# Patient Record
Sex: MALE | Race: BLACK OR AFRICAN AMERICAN | NOT HISPANIC OR LATINO | URBAN - METROPOLITAN AREA
[De-identification: names, ages, dates, MRNs, and addresses within clinical notes are randomized per-mention and may not be internally consistent; named-entity substitution may affect disease eponyms.]

---

## 2018-02-12 ENCOUNTER — EMERGENCY (EMERGENCY)
Facility: HOSPITAL | Age: 23
LOS: 1 days | Discharge: HOME | End: 2018-02-12
Attending: EMERGENCY MEDICINE | Admitting: EMERGENCY MEDICINE
Payer: MEDICAID

## 2018-02-12 VITALS
RESPIRATION RATE: 18 BRPM | DIASTOLIC BLOOD PRESSURE: 68 MMHG | OXYGEN SATURATION: 100 % | HEIGHT: 72 IN | SYSTOLIC BLOOD PRESSURE: 109 MMHG | HEART RATE: 123 BPM | TEMPERATURE: 99 F | WEIGHT: 184.97 LBS

## 2018-02-12 VITALS
HEART RATE: 60 BPM | DIASTOLIC BLOOD PRESSURE: 82 MMHG | OXYGEN SATURATION: 99 % | SYSTOLIC BLOOD PRESSURE: 137 MMHG | TEMPERATURE: 99 F | RESPIRATION RATE: 18 BRPM

## 2018-02-12 PROCEDURE — 99283 EMERGENCY DEPT VISIT LOW MDM: CPT

## 2018-02-12 PROCEDURE — 99284 EMERGENCY DEPT VISIT MOD MDM: CPT

## 2018-02-12 RX ORDER — ONDANSETRON 8 MG/1
4 TABLET, FILM COATED ORAL ONCE
Qty: 0 | Refills: 0 | Status: DISCONTINUED | OUTPATIENT
Start: 2018-02-12 | End: 2018-02-16

## 2018-02-12 RX ORDER — ONDANSETRON 8 MG/1
4 TABLET, FILM COATED ORAL ONCE
Qty: 0 | Refills: 0 | Status: COMPLETED | OUTPATIENT
Start: 2018-02-12 | End: 2018-02-12

## 2018-02-12 RX ORDER — ONDANSETRON 8 MG/1
1 TABLET, FILM COATED ORAL
Qty: 6 | Refills: 0
Start: 2018-02-12 | End: 2018-02-13

## 2018-02-12 RX ADMIN — ONDANSETRON 4 MILLIGRAM(S): 8 TABLET, FILM COATED ORAL at 22:02

## 2018-02-12 NOTE — ED PROVIDER NOTE - PROGRESS NOTE DETAILS
Pt spit out initial zofran dose immediately after taking due to taste. Will re-dose, then po challenge

## 2018-02-12 NOTE — ED ADULT TRIAGE NOTE - CHIEF COMPLAINT QUOTE
vomiting x 2 days; poor po intake; fever and cough this weekend; two family members at home with flu

## 2018-02-12 NOTE — ED SUB INTERN NOTE - OBJECTIVE STATEMENT FT
Pt is 22y M with no PMH p/w coughing and subjective fever x 3 days and vomiting x 2 days. His coughing and fever have since improved. However, starting Saturday, the pt started vomiting with food. Today he is unable to tolerate any PO intake and has been vomiting a/w abd pain. Has constipation x 4 days and feels fatigued. Denies diarrhea, HA, sore throat, sneezing.    IMM: did not receive flu shot this year. All other vaccines UTD.  PMH: none  PSH: none  Med: none  ALL: NKDA

## 2018-02-12 NOTE — ED SUB INTERN NOTE - MEDICAL DECISION MAKING DETAILS
Pt had URI symptoms that have improved. Now with vomiting on PO intake and abd pain. Pt had URI symptoms that have improved. Now with vomiting on PO intake and abd pain. Likely viral illness. Will give Zofran then PO challenge.

## 2018-02-12 NOTE — ED PROVIDER NOTE - OBJECTIVE STATEMENT
22y M with no PMH p/w coughing and subjective fever x 3 days and vomiting x 2 days. Initial 2 days of subjective fever, cough which has since resolved but beginning 2 days ago (1 day after illness onset) developed vomiting, only with food. Since then has had progression of his nausea and today is unable to tolerate most liquids. Notes constipation x 4 days and feels fatigued. Denies diarrhea, HA, sore throat, sneezing. 2 Siblings at home with Flu+

## 2018-02-12 NOTE — ED PROVIDER NOTE - MEDICAL DECISION MAKING DETAILS
Maya Meneses MD - Attending Physician: Pt here with vomiting. Initially subjective fever, +sick contacts with Flu. Zofran and po chall

## 2020-10-01 ENCOUNTER — EMERGENCY (EMERGENCY)
Facility: HOSPITAL | Age: 25
LOS: 1 days | Discharge: HOME | End: 2020-10-01
Attending: EMERGENCY MEDICINE
Payer: MEDICAID

## 2020-10-01 VITALS
TEMPERATURE: 99 F | HEART RATE: 68 BPM | HEIGHT: 72 IN | SYSTOLIC BLOOD PRESSURE: 126 MMHG | DIASTOLIC BLOOD PRESSURE: 69 MMHG | RESPIRATION RATE: 17 BRPM | WEIGHT: 164.91 LBS | OXYGEN SATURATION: 99 %

## 2020-10-01 VITALS
DIASTOLIC BLOOD PRESSURE: 62 MMHG | SYSTOLIC BLOOD PRESSURE: 126 MMHG | HEART RATE: 62 BPM | RESPIRATION RATE: 18 BRPM | OXYGEN SATURATION: 100 %

## 2020-10-01 LAB
ANION GAP SERPL CALC-SCNC: 11 MMOL/L — SIGNIFICANT CHANGE UP (ref 5–17)
BASOPHILS # BLD AUTO: 0.03 K/UL — SIGNIFICANT CHANGE UP (ref 0–0.2)
BASOPHILS NFR BLD AUTO: 0.4 % — SIGNIFICANT CHANGE UP (ref 0–2)
BUN SERPL-MCNC: 9 MG/DL — SIGNIFICANT CHANGE UP (ref 7–23)
CALCIUM SERPL-MCNC: 9.8 MG/DL — SIGNIFICANT CHANGE UP (ref 8.4–10.5)
CHLORIDE SERPL-SCNC: 104 MMOL/L — SIGNIFICANT CHANGE UP (ref 96–108)
CO2 SERPL-SCNC: 25 MMOL/L — SIGNIFICANT CHANGE UP (ref 22–31)
CREAT SERPL-MCNC: 0.89 MG/DL — SIGNIFICANT CHANGE UP (ref 0.5–1.3)
EOSINOPHIL # BLD AUTO: 0.16 K/UL — SIGNIFICANT CHANGE UP (ref 0–0.5)
EOSINOPHIL NFR BLD AUTO: 2 % — SIGNIFICANT CHANGE UP (ref 0–6)
GLUCOSE SERPL-MCNC: 82 MG/DL — SIGNIFICANT CHANGE UP (ref 70–99)
HCT VFR BLD CALC: 40.4 % — SIGNIFICANT CHANGE UP (ref 39–50)
HGB BLD-MCNC: 12.6 G/DL — LOW (ref 13–17)
IMM GRANULOCYTES NFR BLD AUTO: 0.1 % — SIGNIFICANT CHANGE UP (ref 0–1.5)
LYMPHOCYTES # BLD AUTO: 2.49 K/UL — SIGNIFICANT CHANGE UP (ref 1–3.3)
LYMPHOCYTES # BLD AUTO: 30.4 % — SIGNIFICANT CHANGE UP (ref 13–44)
MCHC RBC-ENTMCNC: 25.8 PG — LOW (ref 27–34)
MCHC RBC-ENTMCNC: 31.2 GM/DL — LOW (ref 32–36)
MCV RBC AUTO: 82.8 FL — SIGNIFICANT CHANGE UP (ref 80–100)
MONOCYTES # BLD AUTO: 0.63 K/UL — SIGNIFICANT CHANGE UP (ref 0–0.9)
MONOCYTES NFR BLD AUTO: 7.7 % — SIGNIFICANT CHANGE UP (ref 2–14)
NEUTROPHILS # BLD AUTO: 4.86 K/UL — SIGNIFICANT CHANGE UP (ref 1.8–7.4)
NEUTROPHILS NFR BLD AUTO: 59.4 % — SIGNIFICANT CHANGE UP (ref 43–77)
NRBC # BLD: 0 /100 WBCS — SIGNIFICANT CHANGE UP (ref 0–0)
PLATELET # BLD AUTO: 211 K/UL — SIGNIFICANT CHANGE UP (ref 150–400)
POTASSIUM SERPL-MCNC: 3.8 MMOL/L — SIGNIFICANT CHANGE UP (ref 3.5–5.3)
POTASSIUM SERPL-SCNC: 3.8 MMOL/L — SIGNIFICANT CHANGE UP (ref 3.5–5.3)
RBC # BLD: 4.88 M/UL — SIGNIFICANT CHANGE UP (ref 4.2–5.8)
RBC # FLD: 13.1 % — SIGNIFICANT CHANGE UP (ref 10.3–14.5)
SODIUM SERPL-SCNC: 140 MMOL/L — SIGNIFICANT CHANGE UP (ref 135–145)
WBC # BLD: 8.18 K/UL — SIGNIFICANT CHANGE UP (ref 3.8–10.5)
WBC # FLD AUTO: 8.18 K/UL — SIGNIFICANT CHANGE UP (ref 3.8–10.5)

## 2020-10-01 PROCEDURE — 71046 X-RAY EXAM CHEST 2 VIEWS: CPT

## 2020-10-01 PROCEDURE — 99284 EMERGENCY DEPT VISIT MOD MDM: CPT

## 2020-10-01 PROCEDURE — 71046 X-RAY EXAM CHEST 2 VIEWS: CPT | Mod: 26

## 2020-10-01 PROCEDURE — 80048 BASIC METABOLIC PNL TOTAL CA: CPT

## 2020-10-01 PROCEDURE — 36415 COLL VENOUS BLD VENIPUNCTURE: CPT

## 2020-10-01 PROCEDURE — 85025 COMPLETE CBC W/AUTO DIFF WBC: CPT

## 2020-10-01 PROCEDURE — 99283 EMERGENCY DEPT VISIT LOW MDM: CPT

## 2020-10-01 NOTE — ED ADULT NURSE REASSESSMENT NOTE - NS ED NURSE REASSESS COMMENT FT1
ALEJO Abebe contacted patient at this time. patient verbally stated "I took the IV out of my arm" patient made aware that [facility] police was notified.

## 2020-10-01 NOTE — ED PROVIDER NOTE - CLINICAL SUMMARY MEDICAL DECISION MAKING FREE TEXT BOX
Pt with recent night sweats, lymphadenopathy,  malaise, weight loss 15lb- no sick contacts- check bloods and cxr- if normal dc home   f/u with gen medicine

## 2020-10-01 NOTE — ED ADULT NURSE REASSESSMENT NOTE - NS ED NURSE REASSESS COMMENT FT1
ALEJO Abebe spoke to [facility] and stated that patient stated "I took IV out of my arm before leaving"

## 2020-10-01 NOTE — ED ADULT NURSE REASSESSMENT NOTE - NS ED NURSE REASSESS COMMENT FT1
patient eloped to without d/c paperwork. patient called at home to come back to ED for paperwork and to make sure IV is no longer in place. ALEJO valero and charge nurse aureliano made aware.

## 2020-10-01 NOTE — ED PROVIDER NOTE - NSFOLLOWUPINSTRUCTIONS_ED_ALL_ED_FT
REst, increase activity as tolerated    REturn to the ER for any concerns    Follow up with medicine clinic [phone]    Take multi vitamins daily Rest, increase activity as tolerated    Return to the ER for any concerns    Follow up with medicine clinic [phone]    Take multi vitamins daily

## 2020-10-01 NOTE — ED ADULT NURSE REASSESSMENT NOTE - NS ED NURSE REASSESS COMMENT FT1
patient called several times to come back to ED for paper work and to verify IV is no longer in place. patient stated he would come back but has not shown up at this time. ALEJO Abebe made aware.

## 2020-10-01 NOTE — ED PROVIDER NOTE - PHYSICAL EXAMINATION
GEN: NAD, awake, eyes open spontaneously  EYES: normal conjunctiva, perrl  ENT: NCAT, MMM, Trachea midline, anterior LAD noted  CHEST/LUNGS: Non-tachypneic, CTAB, bilateral breath sounds, no axillary LAD  CARDIAC: Non-tachycardic, normal perfusion  ABDOMEN: Soft, NTND, No rebound/guarding, no hepatosplenomegaly  MSK: No edema, no gross deformity of extremities  SKIN: No rashes, no petechiae, no vesicles  NEURO: CN grossly intact, normal coordination, no focal motor or sensory deficits  PSYCH: Alert, appropriate, cooperative, with capacity and insight

## 2020-10-01 NOTE — ED ADULT NURSE NOTE - NSELOPED_ED_ALL_ED
Call was placed to patient's given phone number to arrange for patient to  instructions and/or prescription./Patient's chart was referred to charge nurse/supervisor for disposition of prescription and/or discharge instructions.

## 2020-10-01 NOTE — ED ADULT TRIAGE NOTE - CHIEF COMPLAINT QUOTE
pt c/o R neck pain x yesterday associated with fatigue.  per pt, he noticed a lump in his neck and to L axilla.

## 2020-10-01 NOTE — ED ADULT NURSE NOTE - OBJECTIVE STATEMENT
24 yo male with no pmhx, pshx coming to ED c/o right sided lump on the neck. Pt noticed lump in his axilla at first, which is now gone and noted the lump on his neck today. Pt denies any fever, chills, n/v/d, difficulty breathing, difficulty swallowing. States he has some pain in the neck with movements. Otherwise feels in his normal state of health. VSS/ NAD. He is A&O x4 and well appearing. Labs drawn and sent. Pt made aware of the care plan.

## 2020-10-01 NOTE — ED ADULT NURSE REASSESSMENT NOTE - NS ED NURSE REASSESS COMMENT FT1
report received from tracie rueda. patient resting comfortably on stretcher. patient denies pain/discomfort at this time. patient is aaox3, lungs CTA bilaterally, abd soft, nondistended, nontender, cap refill <3, radial pulses +2 bilaterally. patient denies chest pain, shortness of breath, ha, dizziness, weakness, numbness or tingling, fever, chills, cough, abd pain, changes in bowel or bladder, hematuria, bloody stool. patient comfort and safety provided. will continue to monitor.

## 2020-10-01 NOTE — ED PROVIDER NOTE - PROGRESS NOTE DETAILS
Ramy Layton MD, FACEP: patient left before waiting for his discharge paperwork and removal of IV. 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 Ramy Layton MD, FACEP: patient educated that he should follow up with primary medical doctor and possibly hematology/oncology if needed.   The patient and/or family/guardian were given the opportunity to ask questions and have them answered in full. The patient and/or family/guardian are with capacity and insight into the situation, treatment, risks, benefits, alternative therapies, and understand that they can ask any further questions if needed.

## 2020-10-01 NOTE — ED ADULT NURSE NOTE - NS ED NURSE ELOPE COMMENTS
patient was called at home and told to return to ED for d/c instructions and to verfiy IV is out. charge nurse aureliano mcintosh and lia lane made aware.

## 2020-10-01 NOTE — ED PROVIDER NOTE - PATIENT PORTAL LINK FT
You can access the FollowMyHealth Patient Portal offered by [facility] by registering at the following website: [email or web address]. By joining [facility]’s FollowMyHealth portal, you will also be able to view your health information using other applications (apps) compatible with our system.

## 2020-10-01 NOTE — ED ADULT NURSE REASSESSMENT NOTE - NS ED NURSE REASSESS COMMENT FT1
patient still has not arrived to ED. [employer] [employer] notified at this time. spoke with officer otoniel and notified them of patient info and possibility that patient remains with IV. waiting call back with update.

## 2020-10-01 NOTE — ED PROVIDER NOTE - OBJECTIVE STATEMENT
24 yo male in room orange 12 presents to the ER for evaluation of pain to right anterior neck.   Pt states 26 yo male in room orange 12 presents to the ER for evaluation of pain to right anterior neck.   Pt states I have unintentionally lost 15 lb in the last 6 months and I have noticed lumps to the right side of my neck, left groin and armpit.  I am concerned there is something wrong with me because I have been feeling rundown too". Pt denies sick contacts. DEnies fevers and chills. 24 yo male in room orange 12 presents to the ER for evaluation of pain to right anterior neck.   Pt states I have unintentionally lost 15 lb in the last 6 months and I have noticed lumps to the right side of my neck, left groin and armpit.  I am concerned there is something wrong with me because I have been feeling rundown too". Pt denies sick contacts. Denies fevers and chills. Patient has not taken anything for pain or weight loss. No history of COVID-19. 26 yo male in room orange 12 presents to the ER for evaluation of pain to right anterior neck. Patient states I have unintentionally lost 15 lb in the last 6 months and I have noticed lumps to the right side of my neck, left groin and armpit.  I am concerned there is something wrong with me because I have been feeling rundown too". Pt denies sick contacts. Denies fevers and chills. Patient has not taken anything for pain or weight loss. No history of COVID-19.

## 2021-09-23 NOTE — ED ADULT NURSE NOTE - NS_SISCREENINGSR_GEN_ALL_ED
CMS/[facility])      180 tablet 3   • omeprazole (PrilOSEC) 20 MG capsule Take 1 capsule by mouth daily. 90 capsule 3   • levothyroxine 75 MCG tablet Take 1 tablet by mouth daily. 90 tablet 3   • lovastatin (MEVACOR) 20 MG tablet Take 1 tablet by mouth daily. 90 tablet 3   • Cyanocobalamin (VITAMIN B-12 PO) Take 1 tablet by mouth daily.     • traMADol (ULTRAM) 50 MG tablet Take 50 mg by mouth every 6 hours as needed.     • hydroxychloroquine (PLAQUENIL) 200 MG tablet Take 200 mg by mouth 2 times daily.       No current facility-administered medications for this visit.     ALLERGIES:   Allergen Reactions   • Penicillins RASH         PE:  Visit Vitals  BP (!) 159/80 (BP Location: LUE - Left upper extremity, Patient Position: Sitting, Cuff Size: Regular)   Pulse 72   Temp 98.8 °F (37.1 °C) (Oral)   Wt 89.3 kg (196 lb 13.9 oz)   LMP  (LMP Unknown)   SpO2 98%   BMI 32.76 kg/m²      Alert NAD  Conj clear, pink, sclera not injection    Chest clear to auscultation without crackles/rales without wheeze on forced exhale  CV - RRR without murmur  abd - soft, NT/ND  No c/c/e  distal pulses easily palpable         A/P:  1. Sinus pressure   ?allergies    2. HTN   Still not at goal   Would increase to BID dosing on Amlodipine      Electronically signed by: João Angeles MD, 09/23/21 9:38 AM      Negative

## 2022-11-28 ENCOUNTER — EMERGENCY (EMERGENCY)
Facility: HOSPITAL | Age: 27
LOS: 1 days | Discharge: HOME | End: 2022-11-28
Attending: EMERGENCY MEDICINE | Admitting: EMERGENCY MEDICINE
Payer: MEDICAID

## 2022-11-28 VITALS
RESPIRATION RATE: 18 BRPM | TEMPERATURE: 98 F | OXYGEN SATURATION: 99 % | WEIGHT: 169.98 LBS | DIASTOLIC BLOOD PRESSURE: 81 MMHG | HEART RATE: 114 BPM | HEIGHT: 73 IN | SYSTOLIC BLOOD PRESSURE: 140 MMHG

## 2022-11-28 PROCEDURE — 99284 EMERGENCY DEPT VISIT MOD MDM: CPT

## 2022-11-29 VITALS
SYSTOLIC BLOOD PRESSURE: 122 MMHG | RESPIRATION RATE: 16 BRPM | DIASTOLIC BLOOD PRESSURE: 77 MMHG | OXYGEN SATURATION: 98 % | HEART RATE: 84 BPM

## 2022-11-29 LAB
APPEARANCE UR: CLEAR — SIGNIFICANT CHANGE UP
BACTERIA # UR AUTO: NEGATIVE /HPF — SIGNIFICANT CHANGE UP
BILIRUB UR-MCNC: NEGATIVE — SIGNIFICANT CHANGE UP
C TRACH RRNA SPEC QL NAA+PROBE: SIGNIFICANT CHANGE UP
COD CRY URNS QL: ABNORMAL
COLOR SPEC: YELLOW — SIGNIFICANT CHANGE UP
DIFF PNL FLD: NEGATIVE — SIGNIFICANT CHANGE UP
EPI CELLS # UR: SIGNIFICANT CHANGE UP
GLUCOSE UR QL: NEGATIVE — SIGNIFICANT CHANGE UP
HIV 1+2 AB+HIV1 P24 AG SERPL QL IA: SIGNIFICANT CHANGE UP
KETONES UR-MCNC: NEGATIVE — SIGNIFICANT CHANGE UP
LEUKOCYTE ESTERASE UR-ACNC: NEGATIVE — SIGNIFICANT CHANGE UP
N GONORRHOEA RRNA SPEC QL NAA+PROBE: SIGNIFICANT CHANGE UP
NITRITE UR-MCNC: NEGATIVE — SIGNIFICANT CHANGE UP
PH UR: 6 — SIGNIFICANT CHANGE UP (ref 5–8)
PROT UR-MCNC: 30 MG/DL
RBC CASTS # UR COMP ASSIST: NEGATIVE /HPF — SIGNIFICANT CHANGE UP (ref 0–4)
SP GR SPEC: 1.02 — SIGNIFICANT CHANGE UP (ref 1.01–1.02)
SPECIMEN SOURCE: SIGNIFICANT CHANGE UP
UROBILINOGEN FLD QL: 4
WBC UR QL: NEGATIVE /HPF — SIGNIFICANT CHANGE UP (ref 0–5)

## 2022-11-29 PROCEDURE — 81001 URINALYSIS AUTO W/SCOPE: CPT

## 2022-11-29 PROCEDURE — 87591 N.GONORRHOEAE DNA AMP PROB: CPT

## 2022-11-29 PROCEDURE — 87491 CHLMYD TRACH DNA AMP PROBE: CPT

## 2022-11-29 PROCEDURE — 99283 EMERGENCY DEPT VISIT LOW MDM: CPT

## 2022-11-29 PROCEDURE — 87389 HIV-1 AG W/HIV-1&-2 AB AG IA: CPT

## 2022-11-29 PROCEDURE — 87086 URINE CULTURE/COLONY COUNT: CPT

## 2022-11-29 PROCEDURE — 36415 COLL VENOUS BLD VENIPUNCTURE: CPT

## 2022-11-29 NOTE — ED ADULT NURSE REASSESSMENT NOTE - NS ED NURSE REASSESS COMMENT FT1
----- Message from Kasia Crowley sent at 1/27/2017  8:56 AM CST -----  Regarding: prep  Pre-Op testing required No, Patient informed No  Prep required? Patient is scheduled for a colonoscopy with Dr Dr. Darling Temple on 3.17.2017.  Please send Nulytely prep to [facility].    Pharmacy phone number .    Thank you; Briefly reviewed? Yes; Prep cost range discussed? No  If procedure is scheduled 7 days or less, patient was told to  prep letter?: No   Received pt. from previous RN.  Pt. discharged pending lab draw.

## 2022-11-29 NOTE — ED PROVIDER NOTE - PATIENT PORTAL LINK FT
You can access the FollowMyHealth Patient Portal offered by [facility] by registering at the following website: [email or web address]. By joining [facility]’s FollowMyHealth portal, you will also be able to view your health information using other applications (apps) compatible with our system.

## 2022-11-29 NOTE — ED PROVIDER NOTE - NSFOLLOWUPINSTRUCTIONS_ED_ALL_ED_FT
-- Return to the ER for worsening or persistent symptoms, and/or ANY NEW OR CONCERNING SYMPTOMS.    -- If you have difficulty following up, please call: [phone] to obtain a [facility] doctor or specialist who takes your insurance in your area.

## 2022-11-29 NOTE — ED PROVIDER NOTE - PHYSICAL EXAMINATION
Gen: alert, NAD  HEENT:  NC/AT, PERR, no exophthalmos  CV:  well perfused, rrr   Pulm:  normal RR, breathing comfortably, CTA b/l  Abd: s/nt/nd  : normal  exam, no lesions on testicles, normal lye  MSK: moving all extremities  Neuro:  non-focal  Skin:  visualized areas intact  Psych: AOx3

## 2022-11-29 NOTE — ED ADULT NURSE NOTE - ASSOCIATED SYMPTOMS
pt axox4, well appearing, skin warm dry unremarkable, lungs clear, airway patent. Pt has bump on scrotum

## 2022-11-30 LAB
CULTURE RESULTS: SIGNIFICANT CHANGE UP
SPECIMEN SOURCE: SIGNIFICANT CHANGE UP

## 2023-05-26 NOTE — ED ADULT NURSE NOTE - CAS TRG GENERAL NORM CIRC DET
Normal rate, regular rhythm.   No murmurs, rubs or gallops. Capillary refill less/equal to 2 seconds/Strong peripheral pulses

## 2023-12-01 NOTE — ED ADULT NURSE NOTE - CHIEF COMPLAINT
Detail Level: Zone Photo Preface (Leave Blank If You Do Not Want): Photographs were obtained today The patient is a 27y Male complaining of std/sti check.